# Patient Record
Sex: FEMALE | Race: OTHER | Employment: UNEMPLOYED | URBAN - METROPOLITAN AREA
[De-identification: names, ages, dates, MRNs, and addresses within clinical notes are randomized per-mention and may not be internally consistent; named-entity substitution may affect disease eponyms.]

---

## 2018-02-01 ENCOUNTER — HOSPITAL ENCOUNTER (EMERGENCY)
Age: 23
Discharge: HOME | End: 2018-02-01
Attending: EMERGENCY MEDICINE
Payer: SELF-PAY

## 2018-02-01 VITALS
DIASTOLIC BLOOD PRESSURE: 88 MMHG | WEIGHT: 145 LBS | HEART RATE: 101 BPM | SYSTOLIC BLOOD PRESSURE: 131 MMHG | HEIGHT: 65 IN | OXYGEN SATURATION: 98 % | RESPIRATION RATE: 18 BRPM | BODY MASS INDEX: 24.16 KG/M2 | TEMPERATURE: 98.8 F

## 2018-02-01 DIAGNOSIS — J45.21 MILD INTERMITTENT ASTHMA WITH EXACERBATION: Primary | ICD-10-CM

## 2018-02-01 DIAGNOSIS — N92.6 IRREGULAR MENSES: ICD-10-CM

## 2018-02-01 LAB
BACTERIA URNS QL MICRO: 0 /HPF
CASTS URNS QL MICRO: 0 /LPF
CRYSTALS URNS QL MICRO: 0 /LPF
EPI CELLS #/AREA URNS HPF: NORMAL /HPF
HCG UR QL: NEGATIVE
MUCOUS THREADS URNS QL MICRO: 0 /LPF
RBC #/AREA URNS HPF: NORMAL /HPF
WBC URNS QL MICRO: NORMAL /HPF

## 2018-02-01 PROCEDURE — 96372 THER/PROPH/DIAG INJ SC/IM: CPT | Performed by: NURSE PRACTITIONER

## 2018-02-01 PROCEDURE — 99284 EMERGENCY DEPT VISIT MOD MDM: CPT | Performed by: NURSE PRACTITIONER

## 2018-02-01 PROCEDURE — 74011000250 HC RX REV CODE- 250: Performed by: NURSE PRACTITIONER

## 2018-02-01 PROCEDURE — 81003 URINALYSIS AUTO W/O SCOPE: CPT | Performed by: NURSE PRACTITIONER

## 2018-02-01 PROCEDURE — 94640 AIRWAY INHALATION TREATMENT: CPT

## 2018-02-01 PROCEDURE — 74011250636 HC RX REV CODE- 250/636: Performed by: NURSE PRACTITIONER

## 2018-02-01 PROCEDURE — 81015 MICROSCOPIC EXAM OF URINE: CPT | Performed by: EMERGENCY MEDICINE

## 2018-02-01 PROCEDURE — 81025 URINE PREGNANCY TEST: CPT

## 2018-02-01 RX ORDER — ALBUTEROL SULFATE 90 UG/1
2 AEROSOL, METERED RESPIRATORY (INHALATION)
Qty: 1 INHALER | Refills: 0 | Status: SHIPPED | OUTPATIENT
Start: 2018-02-01

## 2018-02-01 RX ORDER — PREDNISONE 20 MG/1
20 TABLET ORAL DAILY
Qty: 5 TAB | Refills: 0 | Status: SHIPPED | OUTPATIENT
Start: 2018-02-01 | End: 2018-02-06

## 2018-02-01 RX ORDER — DEXAMETHASONE SODIUM PHOSPHATE 100 MG/10ML
10 INJECTION INTRAMUSCULAR; INTRAVENOUS
Status: COMPLETED | OUTPATIENT
Start: 2018-02-01 | End: 2018-02-01

## 2018-02-01 RX ORDER — ALBUTEROL SULFATE 0.83 MG/ML
1.25 SOLUTION RESPIRATORY (INHALATION) ONCE
Status: COMPLETED | OUTPATIENT
Start: 2018-02-01 | End: 2018-02-01

## 2018-02-01 RX ADMIN — ALBUTEROL SULFATE 1.25 MG: 2.5 SOLUTION RESPIRATORY (INHALATION) at 16:25

## 2018-02-01 RX ADMIN — DEXAMETHASONE SODIUM PHOSPHATE 10 MG: 10 INJECTION INTRAMUSCULAR; INTRAVENOUS at 15:41

## 2018-02-01 NOTE — ED PROVIDER NOTES
HPI Comments: 24-year-old female to the emergency department for evaluation of fatigue after an asthma attack earlier today. She reports that she arrived back from [facility] about a week ago. Since that time she found out that her significant other has been seeing other people. Today she was cleaning with Clorox and this brought on an attack of her asthma. She is feeling a little better now but she is very tired after being very short of breath earlier today. She admits some anxiety as well due to her significant other. In [facility] she would usually get a shot to help with her shortness of breath as well as she doesn't have her inhaler anymore. LMP was 3 months ago and she is menstruating now. Patient is a 25 y.o. female presenting with fatigue. The history is provided by the patient. The history is limited by a language barrier. A  was used. Fatigue   This is a new problem. The current episode started 3 to 5 hours ago. The problem has been gradually improving. There was no focality noted. Pertinent negatives include no focal weakness, no loss of sensation, no loss of balance, no slurred speech, no speech difficulty, no memory loss, no movement disorder, no agitation, no visual change, no auditory change, no mental status change, no unresponsiveness and no disorientation. There has been no fever. Associated symptoms include shortness of breath. Pertinent negatives include no chest pain, no vomiting, no altered mental status, no confusion, no headaches, no choking, no nausea, no bowel incontinence and no bladder incontinence. Past Medical History:   Diagnosis Date    Asthma        History reviewed. No pertinent surgical history. History reviewed. No pertinent family history. Social History     Social History    Marital status: SINGLE     Spouse name: N/A    Number of children: N/A    Years of education: N/A     Occupational History    Not on file.      Social History Main Topics    Smoking status: Never Smoker    Smokeless tobacco: Never Used    Alcohol use No    Drug use: No    Sexual activity: Not on file     Other Topics Concern    Not on file     Social History Narrative    No narrative on file         ALLERGIES: Review of patient's allergies indicates no known allergies. Review of Systems   Constitutional: Positive for fatigue. Negative for chills and fever. HENT: Negative for facial swelling and mouth sores. Eyes: Negative for photophobia and discharge. Respiratory: Positive for chest tightness, shortness of breath and wheezing. Negative for choking. Cardiovascular: Negative for chest pain and palpitations. Gastrointestinal: Negative for abdominal pain, bowel incontinence, nausea and vomiting. Endocrine: Negative for cold intolerance and heat intolerance. Genitourinary: Positive for menstrual problem and vaginal bleeding. Negative for bladder incontinence and dysuria. Musculoskeletal: Negative for back pain and neck pain. Skin: Negative for pallor and rash. Neurological: Negative for dizziness, focal weakness, speech difficulty, weakness, headaches and loss of balance. Psychiatric/Behavioral: Negative for agitation, confusion, decreased concentration and memory loss. Vitals:    02/01/18 1241   BP: 117/82   Pulse: 97   Resp: 17   Temp: 98.8 °F (37.1 °C)   SpO2: 99%   Weight: 65.8 kg (145 lb)   Height: 5' 5\" (1.651 m)            Physical Exam   Constitutional: She is oriented to person, place, and time. She appears well-developed and well-nourished. No distress. HENT:   Head: Normocephalic and atraumatic. Right Ear: External ear normal.   Left Ear: External ear normal.   Nose: Nose normal.   Eyes: Conjunctivae and EOM are normal. Pupils are equal, round, and reactive to light. Neck: Normal range of motion. Neck supple. Cardiovascular: Normal rate, regular rhythm and normal heart sounds.     Pulmonary/Chest: Effort normal and breath sounds normal. No respiratory distress. She has no wheezes. No wheezes now   Abdominal: Soft. Bowel sounds are normal. She exhibits no distension. There is no tenderness. Musculoskeletal: Normal range of motion. She exhibits no edema or tenderness. Neurological: She is alert and oriented to person, place, and time. No cranial nerve deficit. Coordination normal.   Skin: Skin is warm and dry. No rash noted. Psychiatric: She has a normal mood and affect. Her behavior is normal. Judgment and thought content normal.   Nursing note and vitals reviewed. MDM  Number of Diagnoses or Management Options  Diagnosis management comments: 26-year-old female to the emergency department for evaluation of fatigue after an asthma attack earlier today. She reports that she arrived back from [facility] about a week ago. Since that time she found out that her significant other has been seeing other people. Today she was cleaning with Clorox and this brought on an attack of her asthma. She is feeling a little better now but she is very tired after being very short of breath earlier today. She admits some anxiety as well due to her significant other. In [facility] she would usually get a shot to help with her shortness of breath as well as she doesn't have her inhaler anymore. LMP was 3 months ago and she is menstruating now. She has no wheezing now. However she feels uneasy still and tired. Will provide im steroid and albuterol in haler in ed. Discussed with her SO out of room - she admits she is safe upon dc.  also discussed no more use of Clorox with her hsx of asthma Will dc after neb with albuterol inhaler. And follow with clinic for continued care. Neg preg test completed.          Amount and/or Complexity of Data Reviewed  Clinical lab tests: ordered and reviewed    Risk of Complications, Morbidity, and/or Mortality  Presenting problems: minimal  Diagnostic procedures: minimal  Management options: minimal    Patient Progress  Patient progress: stable        ED Course       Procedures

## 2018-02-01 NOTE — ED TRIAGE NOTES
Pt arrived via EMS with initial c/o SOB, however denies SOB upon arrival. States history of asthma attack, did not get breathing treatment from EMS, symptoms cleared on their own.  Pt reports feeling weak X 1 week, since arrival from [facility].

## 2018-02-01 NOTE — PROGRESS NOTES
present at bedside during assessment with Etelvina Gamboa NP    [address] [facility]  [phone]

## 2018-02-01 NOTE — DISCHARGE INSTRUCTIONS
Aprenda sobre los desencadenantes del asma  [Learning About Asthma Triggers]  ¿Qué son los factores desencadenantes? Cuando usted tiene asma, ciertas cosas pueden empeorar abigail síntomas. Estas se conocen ovidio factores desencadenantes. Estos incluyen:  · El humo del cigarrillo o la contaminación del aire. · Cosas a las que usted es alérgico, tales ovidio:  ¨ Polen, moho o ácaros del polvo. ¨ Pelo, piel y saliva de mascotas. · [employer] resfriados, la gripe o la neumonía. · El ejercicio. · El aire seco y frío. ¿Cómo afectan los factores desencadenantes el asma? Los factores desencadenantes dificultan el funcionamiento normal de abigail pulmones y pueden llevar a jerry dificultad súbita para respirar y a otros síntomas. Cuando usted está alrededor de un factor desencadenante, un ataque de asma es más probable. Si abigail síntomas son graves, usted puede necesitar tratamiento de emergencia o puede tener que ir al hospital para recibir tratamiento. Si usted sabe cuáles son abigail factores desencadenantes y puede evitarlos, puede ser capaz de prevenir ataques de asma, reducir la frecuencia con que los tiene y hacer que issa menos graves. ¿Qué puede hacer para evitar los factores desencadenantes? Lo abigail es conocer los factores desencadenantes. Cuando tiene síntomas, fíjese en las cosas a dumont alrededor [employer]. Luego busque patrones en lo que puede estar desencadenando abigail síntomas. Registre abigail factores desencadenantes en jerry hoja de papel o en un diario de asma. Cuando tenga dumont lista de posibles factores desencadenantes, colabore con dumont médico para encontrar formas de evitarlos. También puede revisar el funcionamiento de abigail pulmones midiendo dumont flujo espiratorio haleigh (PEF, por abigail siglas en inglés) a lo kareem del día. Dumont PEF puede disminuir cuando se encuentra cerca de las cosas que [employer] síntomas.   Estas son algunas formas para evitar algunos factores desencadenantes comunes. · No fume ni permita que otros lo elias cerca de usted. Si necesita ayuda para dejar de fumar, hable con dumont médico [employer] y medicamentos para dejar de fumar. Estos pueden aumentar abigail probabilidades de abandonar el hábito para siempre. · Si hay mucha contaminación, polen o polvo en el exterior, quédese en casa y [employer] cerradas. Use un acondicionador de aire o un filtro de aire en el hogar. Revise el informe local del tiempo o el periódico para geovany los informes de calidad del aire y de [place]. · Aplíquese jerry vacuna contra la gripe cada año. Hable con dumont médico respecto de la vacuna contra el neumococo. Lávese las leo con frecuencia para prevenir infecciones. · Evite practicar ejercicio al aire delio cuando pauline frío. Si se encuentra en el exterior a bajas temperaturas, póngase jerry bufanda alrededor de la radha y respire por la nariz. ¿Cómo puede manejar un ataque de asma? · Si tiene un plan de acción para el asma, sígalo. En general:  ¨ Use el inhalador de alivio rápido según las indicaciones de dumont médico. Si los síntomas no mejoran después de [employer], pídale a alguien que lo lleve a la feliberto de emergencias. Llame jerry ambulancia si es necesario. ¨ Si dumont médico le shannon otros medicamentos inhalados o pastillas de esteroides, tómelos según las indicaciones. ¿Dónde puede encontrar más información en inglés? Vaya a DealExplorer.be  Rubye Matter [id number] en la búsqueda para aprender más acerca de \"Aprenda sobre los desencadenantes del asma. \"   © 2098-2871 Healthwise, Incorporated. Instrucciones de cuidado adaptadas bajo licencia por [facility] (which disclaims liability or warranty for this information). Estas instrucciones de cuidado son para usarlas con dumont profesional clínico registrado. Si tiene preguntas acerca de jerry afección médica o de estas instrucciones, pregunte siempre a dumont profesional de [employer].  Healthwise [facility] velia cualquier garantía o responsabilidad por dumont uso de esta información. Versión del contenido: [id number]; Última revisión: 23 febrero, 2012                   Ataque de asma: Instrucciones de cuidado - [ Asthma Attack: Care Instructions ]  Instrucciones de cuidado    Jua Npablo un ataque de asma, las vías respiratorias se hinchan y se estrechan. [place] dificulta la respiración. Los ataques de asma graves pueden ser mortales, mehnaz usted puede ayudar a prevenirlos controlando el asma y tratando los síntomas antes de que empeoren. Los síntomas incluyen falta de aire, opresión en el pecho, tos y respiración sibilante (con silbidos). Bubba Guzman a estos síntomas y [address] ayudarle a evitar futuras visitas a la feliberto de urgencias. El médico lo [place] revisado minuciosamente, mehnaz pueden presentarse problemas más tarde. Si nota algún problema o síntomas nuevos, busque tratamiento médico inmediatamente. La atención de seguimiento es jerry parte clave de dumont tratamiento y seguridad. Asegúrese de hacer y acudir a todas las citas, y llame a dumont médico si está teniendo problemas. También es jerry buena idea saber los resultados de los exámenes y mantener jerry lista de los medicamentos que sherif. ¿Cómo puede cuidarse en el hogar? · Siga dumont plan de acción para el asma para prevenir y tratar los ataques. Si no tiene un plan de acción para el asma, colabore con dumont médico para elaborar estrella. · [employer] medicamentos para el asma exactamente ovidio le fueron recetados. Dígale a dumont médico de inmediato cualquier bob que tenga sobre cómo tomarlos. ¨ Use dumont medicamento de alivio rápido cuando tenga síntomas de un ataque. El medicamento de alivio rápido suele ser un inhalador de albuterol. Algunas personas necesitan usar un medicamento de alivio rápido antes de hacer ejercicio. ¨ Ballou dumont medicamento de control todos los días, no solo cuando tenga síntomas. Por lo general, el medicamento de control es un corticosteroide inhalado.  El objetivo es [employer] problemas antes de que [place]. No tome dumont medicamento de control para tratar un ataque que ya ha empezado. No actúa con la suficiente rapidez para ayudarle. ¨ Si dumont médico le recetó pastillas de corticosteroides para usar taz un ataque, tómelas exactamente ovidio se las recetó. Pueden pasar horas [employer] las pastillas empiecen a actuar, mehnaz pueden acortar el episodio y ayudarle a respirar mejor. ¨ Lleve dumont medicamento de alivio rápido siempre con usted. · Hable con dumont médico antes de salvador otros medicamentos. Algunos medicamentos, ovidio la aspirina, pueden causar ataques de asma en [employer]. · Si tiene un medidor de flujo haleigh, úselo para revisar lo lul que está respirando. [place] puede ayudarle a predecir cuándo va a ocurrir un ataque de asma. Entonces puede salvador [employer] para prevenir el ataque o hacerlo menos grave. · No fume ni permita que otros fumen cerca de usted. Evite los lugares con humo. Fumar empeora el asma. Si necesita ayuda para dejar de fumar, hable con dumont médico sobre programas y medicamentos para dejar de fumar. Estos pueden aumentar abigail probabilidades de dejar el hábito para siempre. · [place] qué factores desencadenan abigail ataques de asma y, cuando pueda, evítelos. Los factores desencadenantes comunes [employer] resfriados, el humo, la contaminación del aire, el polvo, el polen, el moho, las [place], las Jocelyne, el estrés y el aire frío. · Evite los resfriados y la gripe. Hágase poner la vacuna antineumocócica. Si ya le sims puesto jerry antes, consulte a dumont médico para saber si necesita jerry segunda dosis. Hágase poner la vacuna contra la gripe cada otoño. Si tiene que estar cerca de personas con resfriados o gripe, lávese las leo con frecuencia. ¿Cuándo debe pedir ayuda? Llame al 911 en cualquier momento que considere que necesita atención de [place]. Por ejemplo, llame si:  ? · Tiene grave dificultad para respirar. ? Llame a dumont médico ahora mismo o busque atención médica inmediata si:  ? · Janae síntomas no mejoran después de seguir el plan de acción para el asma. ? · Tiene nueva o peor dificultad para respirar. ? · La tos y la respiración sibilante (con silbidos) Roslyn Locket. ? · Al toser elimina mucosidad (esputo) amarronada oscura o con mercedes. ? · Tiene fiebre nueva o más ronny. ?Preste especial atención a los cambios en ansari prabhjot y asegúrese de comunicarse con ansari médico si:  ? · Necesita usar el medicamento de alivio rápido [place] de 2 días a la semana (a menos que sea solo para hacer ejercicio). ? · Ansari tos es más profunda o más frecuente que antes, especialmente si nota más mucosidad o un cambio en el color de la mucosidad. ? · No mejora ovidio se esperaba. ¿Dónde puede encontrar más información en inglés? Iain New a http://danni-aide.info/. Rodo Chandler [id number] en la búsqueda para aprender más acerca de \"Ataque de asma: Instrucciones de cuidado - [ Asthma Attack: Care Instructions ]. \"  Revisado: 12 garcia, 2017  Versión del contenido: 11.4  © 0495-9012 Healthwise, Incorporated. Las instrucciones de cuidado fueron adaptadas bajo licencia por [facility] [employer] (which disclaims liability or warranty for this information). Si usted tiene [place] afección médica o sobre estas instrucciones, siempre pregunte a ansari profesional de prabhjot. Healthwise, Incorporated niega toda garantía o responsabilidad por ansari uso de esta información. Plan de acción para el asma: Después de la consulta  [Asthma Action Plan: After Your Visit]  Instrucciones de cuidado  Un plan de acción para el asma se basa en el flujo haleigh y en los síntomas del asma. Clasificar los síntomas y el flujo haleigh en \"zonas\" Gwenette Polaris y verdes puede ayudar a saber qué teixeira grave es el asma y qué medidas debe salvador. Colabore con ansari médico para elaborar ansari plan.  Un plan de acción puede incluir:  · Las lecturas de flujo haleigh y síntomas para cada maren.  · Qué medicamentos debe salvador en cada maren. · Cuándo llamar al Sabrina Sicks.  · Isadore Saner lista de números telefónicos de emergencia. · Jerry lista de abigail factores desencadenantes del asma. La atención de seguimiento es jerry parte clave de dumont tratamiento y seguridad. Asegúrese de hacer y acudir a todas las citas, y llame a dumont médico si está teniendo problemas. También es jerry buena idea saber los resultados de los exámenes y mantener jerry lista de los medicamentos que sherif. ¿Cómo puede cuidarse en el hogar? · [place] abigail medicamentos diarios para minimizar los daños a Joaquin Elizabeth y evitar ataques de asma. · Verifique dumont flujo haleigh cada mañana y noche. Esta es la mejor manera de saber cómo están funcionando los pulmones. · Revise dumont plan de acción para geovany en qué maren está. ¨ Si está en la maren bob, siga tomando abigail medicamentos diarios para el asma según las indicaciones. ¨ Si está en la maren amarilla, puede estar teniendo o tendrá en breve un ataque de asma. Es posible que no tenga ningún síntoma, mehnaz abigail pulmones no están funcionando teixeira lul ovidio deberían. [place] los medicamentos que figuran en dumont plan de acción. Si se mantiene en la maren CHARLEEN, el médico podría necesitar aumentar la dosis o agregar otro medicamento. ¨ Si está en la maren chris, siga dumont plan de acción. Si abigail síntomas o dumont flujo haleigh no mejoran pronto, es posible que tenga que ir a la feliberto de emergencias o que lo tengan que internar en el hospital.  · Lleve un diario del asma. Anote abigail lecturas de flujo haleigh en el diario del asma. Si tiene un ataque, anote cuál fue la causa (si la sabe), los síntomas y qué medicamento tomó. · Asegúrese de saber cómo y cuándo llamar al médico o ir al hospital.  · Lleve el plan de acción para el asma y el diario del asma, junto con dumont medidor de flujo haleigh y abigail medicamentos cuando mariam a dumont médico. Dígale a dumont médico si está teniendo problemas para seguir dumont plan de acción.   ¿Cuándo debe pedir ayuda? Llame al 911 en cualquier momento que considere que necesita atención de emergencia. Por ejemplo, llame si:  · Tiene graves dificultades para respirar. Llame a dumont médico ahora mismo o busque atención médica inmediata si:  · Janae síntomas no mejoran después de seguir el plan de acción para el asma. · Tose con mucosidad (esputo) amarilla, color café oscuro o con mercedes. Preste especial atención a los cambios en dumont prabhjot y asegúrese de comunicarse con dumont médico si:  · La tos y las sibilancias (respiración con silbidos) Mari Siskin. · Necesita usar el medicamento de alivio rápido Edie Libman de 2 días a la semana (a menos que sea solo para hacer ejercicio). · Necesita ayuda para averiguar qué desencadena los ataques de asma. ¿Dónde puede encontrar más información en inglés? Keira Lower a DealExplorer.be  Anneliese Petrolia [id number] en la búsqueda para aprender más acerca de \"Plan de acción para el asma: Después de la consulta. \"   © 1507-2148 Healthwise, Incorporated. Instrucciones de cuidado adaptadas bajo licencia por Kiko Haro (which disclaims liability or warranty for this information). Estas instrucciones de cuidado son para usarlas con dumont profesional clínico registrado. Si tiene preguntas acerca de jerry afección médica o de estas instrucciones, pregunte siempre a dumont profesional de [employer]. Healthwise, Incorporated niega cualquier garantía o responsabilidad por dumont uso de esta información. Versión del contenido: [id number]; Última revisión: 9 marzo, 2012                 Sangrado uterino anormal: Instrucciones de cuidado - [ Abnormal Uterine Bleeding: Care Instructions ]  Instrucciones de cuidado    El sangrado uterino anormal (AUB, por janae siglas en inglés) es un sangrado irregular del útero que dura más o es más intenso de lo normal o que no ocurre en el momento habitual para usted. A veces, se debe a cambios en los niveles hormonales.  También puede estar causado por crecimientos en el Jose Abdullahi, tales ovidio fibromas o pólipos. A veces no se puede encontrar la causa. Podría tener mucho sangrado cuando no está esperando dumont período. Dumont médico puede sugerirle jerry prueba de embarazo si nilesh que está [place]. La atención de seguimiento es jerry parte clave de dumont tratamiento y seguridad. Asegúrese de hacer y acudir a todas las citas, y llame a dumont médico si está teniendo problemas. También es jerry buena idea saber los resultados de los exámenes y mantener jerry lista de los medicamentos que sherif. ¿Cómo puede cuidarse en el hogar? · Sea justin con los medicamentos. [place] los analgésicos (medicamentos para el dolor) exactamente ovidio le fueron indicados. ¨ Si el médico le recetó un analgésico, tómelo según las indicaciones. ¨ Si no está tomando un analgésico recetado, pregúntele a dumont médico si puede salvador estrella de [place]. · Podría faltarle fausto por la pérdida de mercedes. Coma jerry dieta equilibrada con alto contenido de fausto y vitamina C. Entre los alimentos ricos en fausto se encuentran las hollie anthony, los [place], los [place], los frijoles (habichuelas) y las verduras de hojas verdes. Pregúntele a dumont médico si necesita salvador pastillas de fausto o un multivitamínico.  ¿Cuándo debe pedir ayuda? Llame al 911 en cualquier momento que considere que necesita atención de [place]. Por ejemplo, llame si:  ? · Se desmayó (perdió el conocimiento). ?Llame a dumont médico ahora mismo o busque atención médica inmediata si:  ? · Tiene dolor repentino e intenso en el abdomen o la pelvis. ? · Tiene sangrado vaginal intenso. Empapa abigail toallas sanitarias o tampones habituales cada hora taz 2 o más horas. ? · [employer] o aturdimiento, o que está a punto de [place]. ?Preste especial atención a los cambios en dumont prabhjot y asegúrese de comunicarse con dumont médico si:  ? · Tiene un dolor nuevo en el abdomen o la pelvis. ? · Tiene fiebre. ? · El sangrado empeora o dura más de 1 semana.    ? · Piensa que podría estar embarazada. ¿Dónde puede encontrar más información en inglés? April Gupta a [email or web address]-aide.info/. Sabi Walker I286 en la búsqueda para aprender más acerca de \"Sangrado uterino anormal: Instrucciones de cuidado - [ Abnormal Uterine Bleeding: Care Instructions ]. \"  Revisado: 13 octubre, 2016  Versión del contenido: 11.4  © 5677-1649 Healthwise, Incorporated. Las instrucciones de cuidado fueron adaptadas bajo licencia por [employer] (which disclaims liability or warranty for this information). Si usted tiene [place] afección médica o sobre estas instrucciones, siempre pregunte a dumont profesional de prabhjot. Healthwise, Incorporated niega toda garantía o responsabilidad por dumont uso de esta información.

## 2018-02-01 NOTE — ED NOTES
I have reviewed medications, follow up provider options, and discharge instructions with the patient and . The patient verbalized understanding. Copy of discharge information given to patient upon discharge. Prescription(s) given to patient. Patient discharged in no distress. Patient ambulatory to waiting area. No questions at this time.

## 2018-02-01 NOTE — PROGRESS NOTES
present to cover any requests in the Emergency Room. Thank you for this referral,      Kimi Beard, [facility]  /Patient [address]  [address]    [phone]